# Patient Record
Sex: MALE | Race: WHITE | Employment: FULL TIME | URBAN - NONMETROPOLITAN AREA
[De-identification: names, ages, dates, MRNs, and addresses within clinical notes are randomized per-mention and may not be internally consistent; named-entity substitution may affect disease eponyms.]

---

## 2021-07-08 ENCOUNTER — HOSPITAL ENCOUNTER (EMERGENCY)
Age: 41
Discharge: HOME | End: 2021-07-08
Attending: INTERNAL MEDICINE

## 2021-07-08 VITALS
RESPIRATION RATE: 16 BRPM | BODY MASS INDEX: 34.07 KG/M2 | OXYGEN SATURATION: 95 % | WEIGHT: 230 LBS | TEMPERATURE: 98.2 F | HEIGHT: 69 IN | HEART RATE: 98 BPM | SYSTOLIC BLOOD PRESSURE: 158 MMHG | DIASTOLIC BLOOD PRESSURE: 82 MMHG

## 2021-07-08 DIAGNOSIS — G56.03 BILATERAL CARPAL TUNNEL SYNDROME: Primary | ICD-10-CM

## 2021-07-08 PROCEDURE — 99282 EMERGENCY DEPT VISIT SF MDM: CPT

## 2021-07-08 RX ORDER — PREDNISONE 20 MG/1
20 TABLET ORAL EVERY MORNING
Qty: 5 TABLET | Refills: 0 | Status: SHIPPED | OUTPATIENT
Start: 2021-07-08 | End: 2021-07-13

## 2021-07-08 RX ORDER — DOXEPIN HYDROCHLORIDE 10 MG/1
10 CAPSULE ORAL NIGHTLY PRN
Qty: 10 CAPSULE | Refills: 1 | Status: SHIPPED | OUTPATIENT
Start: 2021-07-08 | End: 2021-07-18

## 2021-07-08 ASSESSMENT — ENCOUNTER SYMPTOMS
SHORTNESS OF BREATH: 0
COUGH: 0
CONSTIPATION: 0
DIARRHEA: 0
EYE PAIN: 0
ABDOMINAL PAIN: 0
BLOOD IN STOOL: 0
TROUBLE SWALLOWING: 0
NAUSEA: 0
VOMITING: 0

## 2021-07-08 ASSESSMENT — PAIN SCALES - GENERAL: PAINLEVEL_OUTOF10: 9

## 2021-07-08 ASSESSMENT — PAIN DESCRIPTION - ORIENTATION: ORIENTATION: RIGHT;LEFT

## 2021-07-08 ASSESSMENT — PAIN DESCRIPTION - PAIN TYPE: TYPE: ACUTE PAIN

## 2021-07-08 ASSESSMENT — PAIN DESCRIPTION - LOCATION: LOCATION: ARM;WRIST

## 2021-07-08 ASSESSMENT — PAIN DESCRIPTION - FREQUENCY: FREQUENCY: CONTINUOUS

## 2021-07-08 NOTE — ED NOTES
Presents to ED for bilateral wrist and arm pain. States he has history of carpal tunnel and has been working 12-14hrs days. States he has had the pain/tingling in his bilateral wrists shooting up his arms for the last two nights. Rates pain 9/10. Shows no signs of distress. Vital signs as noted.       Adwoa Santoro RN  07/08/21 6158

## 2021-07-08 NOTE — ED PROVIDER NOTES
[facility]NCOUN[facility]          Pt Name: Fany Duran  MRN: [id number]  [place] 1980  Date of evaluation: 7/8/2021  Treating Resident Physician: Diann Bills MD  Supervising Physician: Dr. Varinder Fernandes       Chief Complaint   Patient presents with    Arm Pain     bilateral    Wrist Pain     bilateral     History obtained from chart review and the patient. HISTORY OF PRESENT ILLNESS    HPI  Fany Duran is a 41 y.o. male with a history of bilateral carpal tunnel syndrome diagnosed 5 months ago who presents to the emergency department for evaluation of bilateral wrist and arm pain. Patient states he is from [place], has an orthopedic surgeon there who is managing his bilateral carpal tunnel. Patient states that he is taking 800 mg of ibuprofen about 5 times daily, is also taking Tylenol and gabapentin and is having severe pain in both hands and wrists for the last 2 to 3 days. Patient states that he has not been able to sleep in this much time despite taking Benadryl at night. He reports in the past with things have helped him her prednisone and hydrocodone. He states he did discuss with his orthopedic surgeon and was told to come into the ED as they cannot prescribe a prostate labs. Patient states that he is currently in [facility] for a job, works as a , has been here for the last 3 months. He states that he has carpal tunnel release surgery scheduled in [place] in 2 weeks. He states he does have splints for both hands/wrists which he has been using as avised by surgeon. He denies any fever, chills, nausea, vomiting, recent injuries or traumas to either upper extremity, lacerations, abdominal pain, headaches, neck pain, vision changes.   He repeatedly is requesting hydrocodone and states this is the only thing that is helped with pain and being able to sleep at night in the past.  Despite being told that hydrocodone is not optimal treatment for his symptoms, he repeatedly is requesting hydrocodone and states \"I just need enough to get me through the two weeks to my surgery. \"  The patient has no other acute complaints at this time. REVIEW OF SYSTEMS   Review of Systems   Constitutional: Negative for chills, fatigue and fever. HENT: Negative for ear pain, postnasal drip and trouble swallowing. Eyes: Negative for pain and visual disturbance. Respiratory: Negative for cough and shortness of breath. Cardiovascular: Negative for chest pain and palpitations. Gastrointestinal: Negative for abdominal pain, blood in stool, constipation, diarrhea, nausea and vomiting. Genitourinary: Negative for dysuria. Musculoskeletal:        Pain and tingling in both hands   Skin: Negative for rash. Neurological: Negative for headaches. PAST MEDICAL AND SURGICAL HISTORY   No past medical history on file. No past surgical history on file. MEDICATIONS   No current facility-administered medications for this encounter. Current Outpatient Medications:     predniSONE (DELTASONE) 20 MG tablet, Take 1 tablet by mouth every morning for 5 days, Disp: 5 tablet, Rfl: 0    doxepin (SINEQUAN) 10 MG capsule, Take 1 capsule by mouth nightly as needed (insomnia), Disp: 10 capsule, Rfl: 1      SOCIAL HISTORY     Social History     Social History Narrative    Not on file     Social History     Tobacco Use    Smoking status: Not on file   Substance Use Topics    Alcohol use: Not on file    Drug use: Not on file         ALLERGIES     Allergies   Allergen Reactions    Tramadol Swelling    Amoxicillin Rash         FAMILY HISTORY   No family history on file. PREVIOUS RECORDS   Previous records reviewed: This is this patient's first visit to [facility] ED, no previous records available on EMR. .        PHYSICAL EXAM     ED Triage Vitals [07/08/21 1241]   BP Temp Temp Source Pulse Resp SpO2 Height Weight   (!) 158/82 98.2 °F (36.8 °C) Oral 98 16 95 % 5' 9\" (1.753 m) 230 lb (104.3 kg)     Initial vital signs and nursing assessment reviewed and normal. Pulsoximetry is normal per my interpretation. Additional Vital Signs:  Vitals:    07/08/21 1241   BP: (!) 158/82   Pulse: 98   Resp: 16   Temp: 98.2 °F (36.8 °C)   SpO2: 95%       Physical Exam  Vitals and nursing note reviewed. Constitutional:       General: He is not in acute distress. Appearance: He is well-developed. He is not diaphoretic. HENT:      Head: Normocephalic and atraumatic. Right Ear: External ear normal.      Left Ear: External ear normal.      Nose: Nose normal.   Eyes:      General: No scleral icterus. Right eye: No discharge. Left eye: No discharge. Conjunctiva/sclera: Conjunctivae normal.   Cardiovascular:      Rate and Rhythm: Normal rate and regular rhythm. Heart sounds: Normal heart sounds. No murmur heard. Pulmonary:      Effort: Pulmonary effort is normal.      Breath sounds: Normal breath sounds. Musculoskeletal:      Cervical back: Normal range of motion. Skin:     General: Skin is warm and dry. Findings: No erythema or rash. Neurological:      Mental Status: He is alert and oriented to person, place, and time. Comments: Positive Tinel's bilaterally.  strength is decreased in both hands. Psychiatric:         Behavior: Behavior normal.         Thought Content: Thought content normal.         Judgment: Judgment normal.             MEDICAL DECISION MAKING   Initial Assessment: Bilateral carpal tunnel syndrome flare up. Opioid-seeking behavior. Plan: oral steroids. ED RESULTS   Laboratory results:  Labs Reviewed - No data to display    Radiologic studies results:  No orders to display       ED Medications administered this visit: Medications - No data to display      ED COURSE        Clinical picture at this time is most consistent with bilateral carpal tunnel syndrome flareups.   Patient is repeatedly asking for 2 weeks worth of hydrocodone for pain control and to help him sleep. Patient is advised that hydrocodone is not the ideal medication for his symptoms. He is offered an IM steroid injection today in the ED and patient refuses. Discussed prednisone 20 mg every morning x5 days with the patient who begrudgingly agrees. Patient advised that if his symptoms do not improve, he may return to the ED but may also be seen in urgent care or may choose to see a local family doctor if he wants to discuss his gabapentin, switching to Lyrica, or other medication changes. Patient is adamant that he does not want to return to the ED and is annoyed that he would have to possibly be seen again for pain. Patient is advised that he will not be given narcotic pain medications today in the ED. He is advised to alternate Tylenol and ibuprofen for his pain, may continue his gabapentin as prescribed by his home orthopedic surgeon, continue to use splints for hands and wrist, apply ice or heat to sites of pain as needed. Will also prescribe doxepine 10 mg qhs prn for insomnia as well. Printed information regarding his diagnoses are given to the patient. Strict return precautions and follow up instructions were discussed with the patient prior to discharge, with which the patient agrees. He is discharged to home in stable condition at this time. MEDICATION CHANGES     New Prescriptions    DOXEPIN (SINEQUAN) 10 MG CAPSULE    Take 1 capsule by mouth nightly as needed (insomnia)    PREDNISONE (DELTASONE) 20 MG TABLET    Take 1 tablet by mouth every morning for 5 days         FINAL DISPOSITION     Final diagnoses:   Bilateral carpal tunnel syndrome     Condition: condition: stable  Dispo: Discharge to home      This transcription was electronically signed.  Parts of this transcriptions may have been dictated by use of voice recognition software and electronically transcribed, and parts may have been transcribed with the assistance of an ED scribe. The transcription may contain errors not detected in proofreading. Please refer to my supervising physician's documentation if my documentation differs.     Electronically Signed: Oziel Lizarraga MD, 07/08/21, 1:20 PM       Oziel Lizarraga MD  Resident  07/08/21 [address], [place]  Resident  07/08/21 8851